# Patient Record
Sex: MALE | Race: ASIAN | NOT HISPANIC OR LATINO | Employment: FULL TIME | URBAN - METROPOLITAN AREA
[De-identification: names, ages, dates, MRNs, and addresses within clinical notes are randomized per-mention and may not be internally consistent; named-entity substitution may affect disease eponyms.]

---

## 2018-06-27 ENCOUNTER — APPOINTMENT (OUTPATIENT)
Dept: RADIOLOGY | Facility: CLINIC | Age: 28
End: 2018-06-27
Payer: COMMERCIAL

## 2018-06-27 ENCOUNTER — OFFICE VISIT (OUTPATIENT)
Dept: FAMILY MEDICINE CLINIC | Facility: CLINIC | Age: 28
End: 2018-06-27
Payer: COMMERCIAL

## 2018-06-27 ENCOUNTER — TRANSCRIBE ORDERS (OUTPATIENT)
Dept: RADIOLOGY | Facility: CLINIC | Age: 28
End: 2018-06-27

## 2018-06-27 VITALS
BODY MASS INDEX: 39.37 KG/M2 | DIASTOLIC BLOOD PRESSURE: 90 MMHG | WEIGHT: 275 LBS | TEMPERATURE: 97.1 F | SYSTOLIC BLOOD PRESSURE: 140 MMHG | RESPIRATION RATE: 16 BRPM | HEIGHT: 70 IN | HEART RATE: 96 BPM

## 2018-06-27 DIAGNOSIS — S89.92XA KNEE INJURY, LEFT, INITIAL ENCOUNTER: ICD-10-CM

## 2018-06-27 DIAGNOSIS — S89.92XA KNEE INJURY, LEFT, INITIAL ENCOUNTER: Primary | ICD-10-CM

## 2018-06-27 PROCEDURE — 99203 OFFICE O/P NEW LOW 30 MIN: CPT | Performed by: FAMILY MEDICINE

## 2018-06-27 PROCEDURE — 73564 X-RAY EXAM KNEE 4 OR MORE: CPT

## 2018-06-27 RX ORDER — NAPROXEN 500 MG/1
250 TABLET ORAL 2 TIMES DAILY WITH MEALS
Qty: 30 TABLET | Refills: 0 | Status: SHIPPED | OUTPATIENT
Start: 2018-06-27

## 2018-06-27 NOTE — LETTER
June 27, 2018     Patient: Juan Rodriguez   YOB: 1990   Date of Visit: 6/27/2018       To Whom it May Concern:    Juan Rodriguez is under my professional care  He was seen in my office on 6/27/2018  He may return to work on 7/9/18  If you have any questions or concerns, please don't hesitate to call           Sincerely,          Shaun Leon MD        CC: No Recipients

## 2018-06-27 NOTE — PROGRESS NOTES
Chief Complaint   Patient presents with    Knee Pain     left knee (injured 2 days ago while playing basketball)        Patient ID: Oliver Mendez is a 28 y o  male  HPI  Pt is seeing for L knee swelling and pain after injury as above by  another player's knee to pt's L knee lateral aspect  -  Tried to elevate L leg, able top bear weight but limping  -  No prior L knee issues     The following portions of the patient's history were reviewed and updated as appropriate: allergies, current medications, past family history, past medical history, past social history, past surgical history and problem list     Review of Systems   Respiratory: Negative  Gastrointestinal: Negative  Genitourinary: Negative  Skin: Negative  Neurological: Negative  Current Outpatient Prescriptions   Medication Sig Dispense Refill    naproxen (NAPROSYN) 500 mg tablet Take 0 5 tablets (250 mg total) by mouth 2 (two) times a day with meals 30 tablet 0     No current facility-administered medications for this visit  Objective:    /90 (BP Location: Left arm, Patient Position: Sitting, Cuff Size: Large)   Pulse 96   Temp (!) 97 1 °F (36 2 °C) (Tympanic)   Resp 16   Ht 5' 9 5" (1 765 m)   Wt 125 kg (275 lb)   BMI 40 03 kg/m²        Physical Exam   Constitutional: No distress  MO   Musculoskeletal:        Left knee: He exhibits decreased range of motion, swelling and effusion  He exhibits no ecchymosis, no deformity, no laceration, no erythema, no LCL laxity, normal patellar mobility and no MCL laxity  Tenderness found  Lateral joint line tenderness noted  Skin: Skin is intact  No rash noted  Assessment/Plan:         Diagnoses and all orders for this visit:    Knee injury, left, initial encounter  -     Ambulatory referral to Physical Therapy; Future  -     XR knee 4+ vw left injury; Future  -     naproxen (NAPROSYN) 500 mg tablet;  Take 0 5 tablets (250 mg total) by mouth 2 (two) times a day with meals            rto in 2 wks                 Grayson Runner, MD

## 2018-06-28 ENCOUNTER — TELEPHONE (OUTPATIENT)
Dept: FAMILY MEDICINE CLINIC | Facility: CLINIC | Age: 28
End: 2018-06-28

## 2018-06-28 NOTE — TELEPHONE ENCOUNTER
----- Message from Lara Francisco MD sent at 6/28/2018 10:03 AM EDT -----  Pl,advise pt -  Knee xrays showed no fracture - cont with a plan that was discussed at OV

## 2018-07-02 ENCOUNTER — OFFICE VISIT (OUTPATIENT)
Dept: FAMILY MEDICINE CLINIC | Facility: CLINIC | Age: 28
End: 2018-07-02
Payer: COMMERCIAL

## 2018-07-02 VITALS
HEART RATE: 76 BPM | HEIGHT: 70 IN | BODY MASS INDEX: 39.37 KG/M2 | DIASTOLIC BLOOD PRESSURE: 90 MMHG | TEMPERATURE: 97.4 F | WEIGHT: 275 LBS | SYSTOLIC BLOOD PRESSURE: 142 MMHG

## 2018-07-02 DIAGNOSIS — S89.92XD INJURY OF LEFT KNEE, SUBSEQUENT ENCOUNTER: Primary | ICD-10-CM

## 2018-07-02 PROCEDURE — 3008F BODY MASS INDEX DOCD: CPT | Performed by: NURSE PRACTITIONER

## 2018-07-02 PROCEDURE — 1036F TOBACCO NON-USER: CPT | Performed by: NURSE PRACTITIONER

## 2018-07-02 PROCEDURE — 99213 OFFICE O/P EST LOW 20 MIN: CPT | Performed by: NURSE PRACTITIONER

## 2018-07-02 NOTE — PROGRESS NOTES
Assessment/Plan:  1  Injury of left knee, subsequent encounter  Naprosyn prn  Ice  Ace wrap  Monitor  Released to rtw full duty   Paperwork completed  Subjective:      Patient ID: Robyn Gray is a 28 y o  male who presents for follow up on knee pain    Follow up on left knee  Hit on outside of left knee while playing basketball  Xray negative  Using ace wrap  Taking naprosyn  Doing much better  Still with occasional pain with stairs  Minimal swelling  Tried to rtw today but needed flma paperwork completed  The following portions of the patient's history were reviewed and updated as appropriate: allergies, current medications, past family history, past medical history, past social history, past surgical history and problem list     Review of Systems   Musculoskeletal: Positive for joint swelling (minimal left knee but improved)  Left knee pain intermittent, "manageable"   Skin: Negative for wound  Neurological: Negative for numbness  Objective:      /90 (BP Location: Left arm, Patient Position: Sitting, Cuff Size: Extra-Large)   Pulse 76   Temp (!) 97 4 °F (36 3 °C) (Temporal)   Ht 5' 9 5" (1 765 m)   Wt 125 kg (275 lb)   BMI 40 03 kg/m²          Physical Exam   Constitutional: He appears well-developed and well-nourished  No distress  Musculoskeletal: Normal range of motion  He exhibits no edema, tenderness or deformity  Left patella stable   Skin: Skin is warm and dry  No erythema  Vitals reviewed